# Patient Record
Sex: FEMALE | HISPANIC OR LATINO | URBAN - METROPOLITAN AREA
[De-identification: names, ages, dates, MRNs, and addresses within clinical notes are randomized per-mention and may not be internally consistent; named-entity substitution may affect disease eponyms.]

---

## 2018-07-02 ENCOUNTER — DOCTOR'S OFFICE (OUTPATIENT)
Dept: URBAN - METROPOLITAN AREA CLINIC 126 | Facility: CLINIC | Age: 48
Setting detail: OPHTHALMOLOGY
End: 2018-07-02
Payer: COMMERCIAL

## 2018-07-02 DIAGNOSIS — H43.813: ICD-10-CM

## 2018-07-02 DIAGNOSIS — H04.123: ICD-10-CM

## 2018-07-02 DIAGNOSIS — H01.001: ICD-10-CM

## 2018-07-02 DIAGNOSIS — H01.004: ICD-10-CM

## 2018-07-02 DIAGNOSIS — H40.1131: ICD-10-CM

## 2018-07-02 PROCEDURE — 92020 GONIOSCOPY: CPT | Performed by: OPHTHALMOLOGY

## 2018-07-02 PROCEDURE — 92014 COMPRE OPH EXAM EST PT 1/>: CPT | Performed by: OPHTHALMOLOGY

## 2018-07-02 ASSESSMENT — REFRACTION_OUTSIDERX
OU_VA: 20/
OD_ADD: +2.25
OS_VA2: 20/
OD_VA1: 20/20
OD_VA3: 20/
OD_VA2: 20/
OD_CYLINDER: +0.50
OS_SPHERE: -0.50
OS_VA1: 20/20
OS_ADD: +2.25
OS_AXIS: 160
OS_CYLINDER: +0.25
OS_VA3: 20/
OD_AXIS: 005
OD_SPHERE: -0.50

## 2018-07-02 ASSESSMENT — REFRACTION_AUTOREFRACTION
OD_CYLINDER: -1.00
OS_CYLINDER: -1.25
OD_AXIS: 164
OS_AXIS: 007

## 2018-07-02 ASSESSMENT — REFRACTION_CURRENTRX
OS_OVR_VA: 20/
OD_OVR_VA: 20/
OS_OVR_VA: 20/
OS_OVR_VA: 20/
OD_OVR_VA: 20/
OD_OVR_VA: 20/

## 2018-07-02 ASSESSMENT — TEAR BREAK UP TIME (TBUT)
OD_TBUT: 1+
OS_TBUT: 1+

## 2018-07-02 ASSESSMENT — SUPERFICIAL PUNCTATE KERATITIS (SPK)
OD_SPK: 1+
OS_SPK: 1+

## 2018-07-02 ASSESSMENT — LID EXAM ASSESSMENTS
OD_BLEPHARITIS: 1+
OS_BLEPHARITIS: 1+

## 2018-07-02 ASSESSMENT — VISUAL ACUITY
OS_BCVA: 20/25
OD_BCVA: 20/25

## 2018-07-02 ASSESSMENT — KERATOMETRY
OS_AXISANGLE_DEGREES: 097
OD_AXISANGLE_DEGREES: 074
OD_K1POWER_DIOPTERS: 45.75
OS_K2POWER_DIOPTERS: 46.75
OD_K2POWER_DIOPTERS: 46.75
OS_K1POWER_DIOPTERS: 45.50

## 2018-07-02 ASSESSMENT — REFRACTION_MANIFEST
OS_VA3: 20/
OS_VA2: 20/
OD_VA2: 20/
OD_VA2: 20/
OS_VA2: 20/
OS_VA3: 20/
OU_VA: 20/
OD_VA1: 20/
OS_VA1: 20/
OD_VA3: 20/
OU_VA: 20/
OD_VA1: 20/
OD_VA3: 20/
OS_VA1: 20/

## 2018-07-02 ASSESSMENT — CONFRONTATIONAL VISUAL FIELD TEST (CVF)
OD_FINDINGS: FULL
OS_FINDINGS: FULL

## 2019-03-14 ENCOUNTER — RX ONLY (RX ONLY)
Age: 49
End: 2019-03-14

## 2019-03-14 ENCOUNTER — DOCTOR'S OFFICE (OUTPATIENT)
Dept: URBAN - METROPOLITAN AREA CLINIC 126 | Facility: CLINIC | Age: 49
Setting detail: OPHTHALMOLOGY
End: 2019-03-14
Payer: COMMERCIAL

## 2019-03-14 DIAGNOSIS — H43.813: ICD-10-CM

## 2019-03-14 DIAGNOSIS — H04.123: ICD-10-CM

## 2019-03-14 DIAGNOSIS — H40.1131: ICD-10-CM

## 2019-03-14 DIAGNOSIS — D23.121: ICD-10-CM

## 2019-03-14 DIAGNOSIS — L20.9: ICD-10-CM

## 2019-03-14 DIAGNOSIS — H01.001: ICD-10-CM

## 2019-03-14 DIAGNOSIS — H01.004: ICD-10-CM

## 2019-03-14 PROCEDURE — 92014 COMPRE OPH EXAM EST PT 1/>: CPT | Performed by: OPHTHALMOLOGY

## 2019-03-14 PROCEDURE — 92020 GONIOSCOPY: CPT | Performed by: OPHTHALMOLOGY

## 2019-03-14 PROCEDURE — 67840 REMOVE EYELID LESION: CPT | Performed by: OPHTHALMOLOGY

## 2019-03-14 ASSESSMENT — SUPERFICIAL PUNCTATE KERATITIS (SPK)
OS_SPK: 1+
OD_SPK: 1+

## 2019-03-14 ASSESSMENT — SPHEQUIV_DERIVED
OS_SPHEQUIV: -0.375
OD_SPHEQUIV: -0.25

## 2019-03-14 ASSESSMENT — VISUAL ACUITY
OS_BCVA: 20/50
OD_BCVA: 20/40

## 2019-03-14 ASSESSMENT — REFRACTION_AUTOREFRACTION
OS_CYLINDER: -1.25
OD_AXIS: 164
OS_AXIS: 007
OD_CYLINDER: -1.00

## 2019-03-14 ASSESSMENT — LID EXAM ASSESSMENTS
OS_BLEPHARITIS: 1+
OD_BLEPHARITIS: 1+

## 2019-03-14 ASSESSMENT — REFRACTION_MANIFEST
OS_VA1: 20/20
OD_VA3: 20/
OS_VA1: 20/
OS_VA2: 20/
OD_VA2: 20/
OD_VA3: 20/
OS_SPHERE: -0.50
OS_VA3: 20/
OU_VA: 20/
OS_CYLINDER: +0.25
OS_VA3: 20/
OD_SPHERE: -0.50
OD_VA2: 20/
OS_ADD: +2.25
OU_VA: 20/
OD_AXIS: 005
OS_AXIS: 160
OS_VA2: 20/
OD_VA1: 20/20
OD_ADD: +2.25
OD_CYLINDER: +0.50
OD_VA1: 20/

## 2019-03-14 ASSESSMENT — CONFRONTATIONAL VISUAL FIELD TEST (CVF)
OS_FINDINGS: FULL
OD_FINDINGS: FULL

## 2019-03-14 ASSESSMENT — REFRACTION_CURRENTRX
OD_OVR_VA: 20/
OD_OVR_VA: 20/
OS_OVR_VA: 20/
OD_OVR_VA: 20/
OS_OVR_VA: 20/
OS_OVR_VA: 20/

## 2019-03-14 ASSESSMENT — TEAR BREAK UP TIME (TBUT)
OS_TBUT: 1+
OD_TBUT: 1+

## 2022-10-19 ENCOUNTER — OFFICE VISIT (OUTPATIENT)
Dept: PRIMARY CARE | Facility: CLINIC | Age: 52
End: 2022-10-19
Payer: COMMERCIAL

## 2022-10-19 VITALS
BODY MASS INDEX: 25.43 KG/M2 | RESPIRATION RATE: 15 BRPM | HEIGHT: 62 IN | SYSTOLIC BLOOD PRESSURE: 118 MMHG | HEART RATE: 84 BPM | WEIGHT: 138.2 LBS | TEMPERATURE: 98 F | OXYGEN SATURATION: 99 % | DIASTOLIC BLOOD PRESSURE: 74 MMHG

## 2022-10-19 DIAGNOSIS — L40.9 PSORIASIS: ICD-10-CM

## 2022-10-19 DIAGNOSIS — Z23 FLU VACCINE NEED: ICD-10-CM

## 2022-10-19 DIAGNOSIS — L65.9 HAIR LOSS: ICD-10-CM

## 2022-10-19 DIAGNOSIS — Z00.00 ROUTINE GENERAL MEDICAL EXAMINATION AT A HEALTH CARE FACILITY: Primary | ICD-10-CM

## 2022-10-19 DIAGNOSIS — R09.82 POST-NASAL DRIP: ICD-10-CM

## 2022-10-19 PROCEDURE — 90686 IIV4 VACC NO PRSV 0.5 ML IM: CPT | Performed by: STUDENT IN AN ORGANIZED HEALTH CARE EDUCATION/TRAINING PROGRAM

## 2022-10-19 PROCEDURE — 3008F BODY MASS INDEX DOCD: CPT | Performed by: STUDENT IN AN ORGANIZED HEALTH CARE EDUCATION/TRAINING PROGRAM

## 2022-10-19 PROCEDURE — 90471 IMMUNIZATION ADMIN: CPT | Performed by: STUDENT IN AN ORGANIZED HEALTH CARE EDUCATION/TRAINING PROGRAM

## 2022-10-19 PROCEDURE — 99386 PREV VISIT NEW AGE 40-64: CPT | Mod: 25 | Performed by: STUDENT IN AN ORGANIZED HEALTH CARE EDUCATION/TRAINING PROGRAM

## 2022-10-19 RX ORDER — TRIAMCINOLONE ACETONIDE 1 MG/G
1 OINTMENT TOPICAL 2 TIMES DAILY
Qty: 30 G | Refills: 1 | Status: SHIPPED | OUTPATIENT
Start: 2022-10-19 | End: 2022-11-18

## 2022-10-19 RX ORDER — LORATADINE 10 MG/1
10 TABLET ORAL DAILY
Qty: 90 TABLET | Refills: 0 | Status: SHIPPED | OUTPATIENT
Start: 2022-10-19 | End: 2023-04-17

## 2022-10-19 RX ORDER — FLUTICASONE PROPIONATE 50 MCG
1 SPRAY, SUSPENSION (ML) NASAL DAILY
Qty: 16 G | Refills: 5 | Status: SHIPPED | OUTPATIENT
Start: 2022-10-19

## 2022-10-19 ASSESSMENT — ENCOUNTER SYMPTOMS
FATIGUE: 0
WOUND: 0
COUGH: 0
ARTHRALGIAS: 0
FEVER: 0
DYSURIA: 0
HEMATURIA: 0
DIARRHEA: 0
ABDOMINAL PAIN: 0
SORE THROAT: 0
SHORTNESS OF BREATH: 0
APPETITE CHANGE: 0
VOMITING: 0
PALPITATIONS: 0
RHINORRHEA: 0
SEIZURES: 0
DIFFICULTY URINATING: 0
NAUSEA: 0
NERVOUS/ANXIOUS: 0
WHEEZING: 0
DIZZINESS: 0
EYE PAIN: 0
CONSTIPATION: 1

## 2022-10-19 ASSESSMENT — PAIN SCALES - GENERAL: PAINLEVEL: 0-NO PAIN

## 2022-10-19 ASSESSMENT — PATIENT HEALTH QUESTIONNAIRE - PHQ9: SUM OF ALL RESPONSES TO PHQ9 QUESTIONS 1 & 2: 0

## 2022-10-19 NOTE — PROGRESS NOTES
[facility]     CHIEF COMPLAINT   New Patient  Previous PCP: Dmitriy, >2yrs     HISTORY OF PRESENT ILLNESS      This is a 52 y.o. female who presents to [facility] care.      Today, admits to chronic psoriasis which is well controlled with intermittent triamcinolone 0.1% ointment.  She states she ran out of this approximately 2 months ago and as such has 1 lesion that is untreated on her anterior left thigh.    Otherwise, admits to chronic history of slowly progressing hair loss on her anterior scalp.  She states that she saw a dermatologist for this who stated this is age-related and did not perform any testing.  Denies any hyper/hypothyroid symptoms.    PAST MEDICAL AND SURGICAL HISTORY      Past Medical History:   Diagnosis Date    Psoriasis        Past Surgical History:   Procedure Laterality Date    BREAST CYST ASPIRATION Right     CYST REMOVAL         MEDICATIONS        Current Outpatient Medications:     fluticasone propionate (FLONASE) 50 mcg/actuation nasal spray, Administer 1 spray into each nostril daily., Disp: 16 g, Rfl: 5    loratadine (CLARITIN) 10 mg tablet, Take 1 tablet (10 mg total) by mouth daily., Disp: 90 tablet, Rfl: 0    triamcinolone (KENALOG) 0.1 % ointment, Apply 1 application topically 2 (two) times a day., Disp: 30 g, Rfl: 1    ALLERGIES      Bee pollen    FAMILY HISTORY      No family history on file.    SOCIAL HISTORY      Social History     Socioeconomic History    Marital status:      Spouse name: None    Number of children: 1    Years of education: None    Highest education level: Some college, no degree   Occupational History    Occupation:      Comment: [employer]   Tobacco Use    Smoking status: Never    Smokeless tobacco: Never   Vaping Use    Vaping Use: Never used   Substance and Sexual Activity    Alcohol use: Yes     Comment: OCCASIONALLY    Drug use: Never    Sexual activity: Not Currently   Social History Narrative  "   Recently , one child.       REVIEW OF SYSTEMS      Review of Systems   Constitutional: Negative for appetite change, fatigue and fever.   HENT: Negative for congestion, hearing loss, rhinorrhea and sore throat.    Eyes: Negative for pain and visual disturbance.   Respiratory: Negative for cough, shortness of breath and wheezing.    Cardiovascular: Negative for chest pain, palpitations and leg swelling.   Gastrointestinal: Positive for constipation. Negative for abdominal pain, diarrhea, nausea and vomiting.   Genitourinary: Negative for difficulty urinating, dysuria and hematuria.   Musculoskeletal: Negative for arthralgias.   Skin: Negative for rash and wound.   Neurological: Negative for dizziness, seizures and syncope.   Psychiatric/Behavioral: Negative for behavioral problems. The patient is not nervous/anxious.        PHYSICAL EXAMINATION      Visit Vitals  /74 (BP Location: Left upper arm, Patient Position: Sitting)   Pulse 84   Temp 36.7 °C (98 °F) (Temporal)   Resp 15   Ht 1.575 m (5' 2\")   Wt 62.7 kg (138 lb 3.2 oz)   SpO2 99%   BMI 25.28 kg/m²     Body mass index is 25.28 kg/m².    Physical Exam  Constitutional:       General: She is not in acute distress.     Appearance: She is well-developed. She is not diaphoretic.   HENT:      Head: Normocephalic and atraumatic.   Eyes:      Pupils: Pupils are equal, round, and reactive to light.   Cardiovascular:      Rate and Rhythm: Normal rate and regular rhythm.      Heart sounds: Normal heart sounds. No murmur heard.    No friction rub. No gallop.   Pulmonary:      Effort: Pulmonary effort is normal. No respiratory distress.      Breath sounds: Normal breath sounds. No wheezing or rales.   Abdominal:      General: Bowel sounds are normal. There is no distension.      Palpations: Abdomen is soft.      Tenderness: There is no abdominal tenderness. There is no guarding or rebound.   Musculoskeletal:         General: No deformity. Normal range of " motion.      Cervical back: Normal range of motion and neck supple.      Right lower leg: No edema.      Left lower leg: No edema.   Skin:     General: Skin is warm and dry.      Coloration: Skin is not jaundiced.      Findings: No rash.   Neurological:      Mental Status: She is alert and oriented to person, place, and time.   Psychiatric:         Mood and Affect: Mood normal.         Behavior: Behavior normal.         LABS / IMAGING / STUDIES        Labs    No results found for: CREATININE  No results found for: WBC, HGB, HCT, MCV, PLT      No results found for: HGBA1C  No results found for: MICROALBUR, [id number]        HEALTH MAINTENANCE           PAP Smear: UTD, 2022  Mammogram: UTD, 2022  Colonoscopy: 2014 or 2015 per pt, due q10yr    Tdap: 2018  Flu: Today  Shingrix: Counseled  COVID-19: Pfizer x2, due for booster    Dentist: UTD    Last PE: 2020  Last Labs: 2022 through employer    The ASCVD Risk score (Ghulam NOGUERA, et al., 2019) failed to calculate for the following reasons:    Cannot find a previous HDL lab    Cannot find a previous total cholesterol lab       ASSESSMENT AND PLAN   Problem List Items Addressed This Visit        Immune    Psoriasis     Stable, refilled patient's triamcinolone.  Referred to dermatology, provided multiple names to the patient may find somebody who takes her insurance.         Relevant Medications    triamcinolone (KENALOG) 0.1 % ointment    Other Relevant Orders    Ambulatory referral to Dermatology    Ambulatory referral to Dermatology       Dermatologic    Hair loss     Of unclear etiology, suspect age-related, however will order TSH to evaluate for thyroid/endocrinologic causes         Relevant Orders    TSH w reflex FT4    Ambulatory referral to Dermatology    Ambulatory referral to Dermatology       Other    Routine general medical examination at a health care facility - Primary     Patient is up-to-date with age-appropriate cancer screenings and vaccinations.   Baseline/repeat/annual labs performed by employer, patient to furnish these records via [employer].  RTO 1 year or sooner PRN.          Other Visit Diagnoses     Flu vaccine need        Relevant Orders    Influenza vaccine quadrivalent preservative free 6 mon and older IM (FluLaval) (Completed)    Post-nasal drip        Relevant Medications    fluticasone propionate (FLONASE) 50 mcg/actuation nasal spray    loratadine (CLARITIN) 10 mg tablet          Health Care Maintenance:    Patient encouraged to increase activity gradually as tolerated with a goal of 150 minutes of aerobic activity per week with 2 sessions of weight training weekly.     Counseled patient on healthy eating (high quality, low carb, low sugar diet).     Advised patient to complete regular dental examinations, brushing and flossing daily.    Encouraged enhanced safety by wearing seat belts, checking smoke and CO detectors.            Rajiv Muir, DO  10/19/22        Some or all of this note has been written using voice recognition software.    Please excuse any typographical errors.

## 2022-10-19 NOTE — ASSESSMENT & PLAN NOTE
Patient is up-to-date with age-appropriate cancer screenings and vaccinations.  Baseline/repeat/annual labs performed by employer, patient to furnish these records via [employer].  RTO 1 year or sooner PRN.

## 2022-10-19 NOTE — ASSESSMENT & PLAN NOTE
Of unclear etiology, suspect age-related, however will order TSH to evaluate for thyroid/endocrinologic causes

## 2022-10-19 NOTE — ASSESSMENT & PLAN NOTE
Stable, refilled patient's triamcinolone.  Referred to dermatology, provided multiple names to the patient may find somebody who takes her insurance.

## 2022-10-19 NOTE — PATIENT INSTRUCTIONS
Ear Discomfort:    Try a daily claritin, zyrtec, allegra, xyzal.    Additionally, you can use flonase nasal spray daily.